# Patient Record
Sex: MALE | URBAN - METROPOLITAN AREA
[De-identification: names, ages, dates, MRNs, and addresses within clinical notes are randomized per-mention and may not be internally consistent; named-entity substitution may affect disease eponyms.]

---

## 2017-02-10 ENCOUNTER — OFFICE VISIT (OUTPATIENT)
Dept: FAMILY MEDICINE | Facility: CLINIC | Age: 27
End: 2017-02-10

## 2017-02-10 VITALS
DIASTOLIC BLOOD PRESSURE: 77 MMHG | HEART RATE: 82 BPM | OXYGEN SATURATION: 97 % | SYSTOLIC BLOOD PRESSURE: 157 MMHG | TEMPERATURE: 98.5 F | WEIGHT: 167 LBS | BODY MASS INDEX: 21.43 KG/M2

## 2017-02-10 DIAGNOSIS — Z11.3 ROUTINE SCREENING FOR STI (SEXUALLY TRANSMITTED INFECTION): ICD-10-CM

## 2017-02-10 DIAGNOSIS — R03.0 ELEVATED BLOOD PRESSURE READING WITHOUT DIAGNOSIS OF HYPERTENSION: ICD-10-CM

## 2017-02-10 DIAGNOSIS — R07.0 THROAT PAIN: Primary | ICD-10-CM

## 2017-02-10 LAB — S PYO AG THROAT QL IA.RAPID: NEGATIVE

## 2017-02-10 ASSESSMENT — PAIN SCALES - GENERAL: PAINLEVEL: NO PAIN (0)

## 2017-02-10 NOTE — PROGRESS NOTES
Chief Complaint: Sore throat and routine STD screening    HPI  1. Sore throat  Sore thoat for past nearly two weeks, intermittent. 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 He is able to sing but does not there is mild strain. Pain is noted prior to sleep at night but overall symptoms are improving. He denies any fever, dysphagia, hoarseness, rash, n/v or fatigue/weakness.    2. STD preventative screening.  Pt requests routine STD screening. He is sexually active with female partners. Neg hx of STDs, with exception of oral herpetic infections noted intermittently and what was noted as herpetic stefani of the 3rd index finger in late December of this year (see EMR records for further details.)       Patient Active Problem List   Diagnosis     CARDIOVASCULAR SCREENING; LDL GOAL LESS THAN 160     Lump or mass in breast     Tinea corporis       Past Medical History   Diagnosis Date     Migraine, unspecified, without mention of intractable migraine without mention of status migrainosus      Intussusception (H)      at 3yo and 9 yo, hospitalized, no surgery       No current outpatient prescriptions on file.       ROS: Negative except what is noted in HPI      OBJECTIVE:  /77 mmHg  Pulse 82  Temp(Src) 98.5  F (36.9  C)  Wt 167 lb (75.751 kg)  SpO2 97%  Constitutional: Pleasant male in no distress   Eyes: sclera are clear, normal extra-ocular movements   Ears, Nose and Throat: TMs clear, nose clear, throat with slight posterior erythema.  No lymphadenopathy.   Cardiovascular: RRR.  No murmurs.  Respiratory: CTA, no wheezes or crackles.  Skin: warm, dry, no rash or lesions   Neurological: CN grossly intact, no tremor   Psychological: appropriate mood   Lymphatic: no cervical lymphadenopathy    ASSESSMENT/PLAN:    1. Throat pain  Rapid strep was negative. Throat is benign in appearance. Recommend tea with lemon and honey, NSAIDs as tolerated. Return to clinic or call should symptoms worsen.  Perhaps his vocal cords are a bit irritated and could consider a short burst steroid as he's a liu and    - Rapid Strep Screen (Group) ([place])  - Beta strep group A culture    2. Routine screening for STI (sexually transmitted infection)  Pt ok with sending results via [employer].   - Neisseria gonorrhoeae PCR  - Chlamydia trachomatis PCR  - Anti Treponema  - HIV Antigen Antibody Combo    3. Elevated blood pressure reading without diagnosis of hypertension  Asked pt to take his BPs at home over next one to two weeks (and purchase a BP cuff.) He agreed to do this and will drop off readings. He noted his brother has HTN (who is two years younger) and found exercise to be helpful and is not treated with medication. I would like to explore this further when he returns with his readings.      Mac Tinoco, RN, MS, CNP  [facility]

## 2017-02-10 NOTE — MR AVS SNAPSHOT
After Visit Summary   2/10/2017    Kolton He    MRN: [id number]           Patient Information     Date Of Birth          1990        Visit Information        Provider Department      2/10/2017 4:00 PM Mac Tinoco NP [facility]        Today's Diagnoses     Throat pain    -  1        Follow-ups after your visit        Who to contact     Please call your clinic at [phone] to:    Ask questions about your health    Make or cancel appointments    Discuss your medicines    Learn about your test results    Speak to your doctor   If you have compliments or concerns about an experience at your clinic, or if you wish to file a complaint, please contact [facility] Patient Relations at [phone] or email us at [email or web address]         Additional Information About Your Visit        MyChart Information     [employer] gives you secure access to your electronic health record. If you see a primary care provider, you can also send messages to your care team and make appointments. If you have questions, please call your primary care clinic.  If you do not have a primary care provider, please call [phone] and they will assist you.      [employer] is an electronic gateway that provides easy, online access to your medical records. With [employer], you can request a clinic appointment, read your test results, renew a prescription or communicate with your care team.     To access your existing account, please contact your [facility] or call [phone] for assistance.        Care EveryWhere ID     This is your Care EveryWhere ID. This could be used by other organizations to access your [facility] medical records  [id number]        Your Vitals Were     Pulse Temperature Pulse Oximetry             82 98.5  F (36.9  C) 97%          Blood Pressure from Last 3 Encounters:   02/10/17 157/77   12/13/16 132/81   02/01/16 148/86     Weight from Last 3 Encounters:   02/10/17 167 lb (75.751 kg)   12/13/16 168 lb (76.204 kg)   02/01/16 164 lb 0.6 oz (74.408 kg)              We Performed the Following     Beta strep group A culture     Rapid Strep Screen (Group) ([place])        Primary Care Provider Office Phone # Fax #    Mirian Blanka Harris [phone]       [place] [phone]        Thank you!     Thank you for choosing [facility]  for your care. Our goal is always to provide you with excellent care. Hearing back from our patients is one way we can continue to improve our services. Please take a few minutes to complete the written survey that you may receive in the mail after your visit with us. Thank you!             Your Updated Medication List - Protect others around you: Learn how to safely use, store and throw away your medicines at www.disposemymeds.org.      Notice  As of 2/10/2017  5:00 PM    You have not been prescribed any medications.

## 2017-02-11 LAB — T PALLIDUM IGG+IGM SER QL: NEGATIVE

## 2017-02-12 LAB
BACTERIA SPEC CULT: NORMAL
C TRACH DNA SPEC QL NAA+PROBE: NORMAL
MICRO REPORT STATUS: NORMAL
N GONORRHOEA DNA SPEC QL NAA+PROBE: NORMAL
SPECIMEN SOURCE: NORMAL

## 2017-02-13 LAB — HIV 1+2 AB+HIV1 P24 AG SERPL QL IA: NORMAL

## 2019-11-05 ENCOUNTER — HEALTH MAINTENANCE LETTER (OUTPATIENT)
Age: 29
End: 2019-11-05

## 2020-11-20 NOTE — NURSING NOTE
26 year old  Chief Complaint   Patient presents with     Pain     in throat with cough x2 weeks      STD     testing       Blood pressure 157/77, pulse 82, temperature 98.5  F (36.9  C), weight 167 lb (75.751 kg), SpO2 97 %. Body mass index is 21.43 kg/(m^2).  Patient Active Problem List   Diagnosis     CARDIOVASCULAR SCREENING; LDL GOAL LESS THAN 160     Lump or mass in breast     Tinea corporis       Wt Readings from Last 2 Encounters:   02/10/17 167 lb (75.751 kg)   12/13/16 168 lb (76.204 kg)     BP Readings from Last 3 Encounters:   02/10/17 157/77   12/13/16 132/81   02/01/16 148/86         No current outpatient prescriptions on file.     No current facility-administered medications for this visit.       Social History   Substance Use Topics     Smoking status: Never Smoker      Smokeless tobacco: Never Used     Alcohol Use: No       Health Maintenance Due   Topic Date Due     INFLUENZA VACCINE (SYSTEM ASSIGNED)  09/01/2016       No results found for this basename: pap      February 10, 2017 4:23 PM   Never

## 2020-11-22 ENCOUNTER — HEALTH MAINTENANCE LETTER (OUTPATIENT)
Age: 30
End: 2020-11-22

## 2021-09-19 ENCOUNTER — HEALTH MAINTENANCE LETTER (OUTPATIENT)
Age: 31
End: 2021-09-19

## 2022-01-09 ENCOUNTER — HEALTH MAINTENANCE LETTER (OUTPATIENT)
Age: 32
End: 2022-01-09

## 2022-11-20 ENCOUNTER — HEALTH MAINTENANCE LETTER (OUTPATIENT)
Age: 32
End: 2022-11-20

## 2023-04-15 ENCOUNTER — HEALTH MAINTENANCE LETTER (OUTPATIENT)
Age: 33
End: 2023-04-15